# Patient Record
Sex: MALE | URBAN - METROPOLITAN AREA
[De-identification: names, ages, dates, MRNs, and addresses within clinical notes are randomized per-mention and may not be internally consistent; named-entity substitution may affect disease eponyms.]

---

## 2024-07-11 ENCOUNTER — CLINICAL DOCUMENTATION (OUTPATIENT)
Age: 34
End: 2024-07-11

## 2024-07-11 NOTE — PROGRESS NOTES
Records received from Dr Brant Maria and per updated office protocol do not meet criteria for further review. Schedule next available for Hep B. Pt insurance per referral is non par YTR